# Patient Record
Sex: MALE | Race: WHITE | NOT HISPANIC OR LATINO | Employment: FULL TIME | URBAN - METROPOLITAN AREA
[De-identification: names, ages, dates, MRNs, and addresses within clinical notes are randomized per-mention and may not be internally consistent; named-entity substitution may affect disease eponyms.]

---

## 2018-06-13 ENCOUNTER — HOSPITAL ENCOUNTER (EMERGENCY)
Facility: HOSPITAL | Age: 23
Discharge: HOME | End: 2018-06-13
Attending: EMERGENCY MEDICINE | Admitting: EMERGENCY MEDICINE

## 2018-06-13 ENCOUNTER — APPOINTMENT (EMERGENCY)
Dept: CT IMAGING | Facility: HOSPITAL | Age: 23
End: 2018-06-13

## 2018-06-13 VITALS
OXYGEN SATURATION: 98 % | DIASTOLIC BLOOD PRESSURE: 66 MMHG | HEART RATE: 88 BPM | WEIGHT: 125.88 LBS | TEMPERATURE: 98.9 F | RESPIRATION RATE: 18 BRPM | SYSTOLIC BLOOD PRESSURE: 128 MMHG

## 2018-06-13 DIAGNOSIS — R56.9 SEIZURE (HCC): Primary | ICD-10-CM

## 2018-06-13 DIAGNOSIS — F19.10 SUBSTANCE ABUSE (HCC): ICD-10-CM

## 2018-06-13 LAB
ALBUMIN SERPL BCP-MCNC: 4.6 G/DL (ref 3.5–5)
ALP SERPL-CCNC: 59 U/L (ref 46–116)
ALT SERPL W P-5'-P-CCNC: 28 U/L (ref 12–78)
AMPHETAMINES SERPL QL SCN: POSITIVE
ANION GAP SERPL CALCULATED.3IONS-SCNC: 9 MMOL/L (ref 4–13)
AST SERPL W P-5'-P-CCNC: 19 U/L (ref 5–45)
BACTERIA UR QL AUTO: ABNORMAL /HPF
BARBITURATES UR QL: NEGATIVE
BASOPHILS # BLD AUTO: 0.03 THOUSANDS/ΜL (ref 0–0.1)
BASOPHILS NFR BLD AUTO: 0 % (ref 0–1)
BENZODIAZ UR QL: POSITIVE
BILIRUB SERPL-MCNC: 0.4 MG/DL (ref 0.2–1)
BILIRUB UR QL STRIP: NEGATIVE
BUN SERPL-MCNC: 15 MG/DL (ref 5–25)
CALCIUM SERPL-MCNC: 9.1 MG/DL (ref 8.3–10.1)
CHLORIDE SERPL-SCNC: 105 MMOL/L (ref 100–108)
CK MB SERPL-MCNC: 1.4 % (ref 0–2.5)
CK MB SERPL-MCNC: 3.2 NG/ML (ref 0–5)
CK SERPL-CCNC: 230 U/L (ref 39–308)
CLARITY UR: ABNORMAL
CO2 SERPL-SCNC: 26 MMOL/L (ref 21–32)
COCAINE UR QL: NEGATIVE
COLOR UR: ABNORMAL
CREAT SERPL-MCNC: 0.87 MG/DL (ref 0.6–1.3)
EOSINOPHIL # BLD AUTO: 0.07 THOUSAND/ΜL (ref 0–0.61)
EOSINOPHIL NFR BLD AUTO: 1 % (ref 0–6)
ERYTHROCYTE [DISTWIDTH] IN BLOOD BY AUTOMATED COUNT: 11.9 % (ref 11.6–15.1)
GFR SERPL CREATININE-BSD FRML MDRD: 124 ML/MIN/1.73SQ M
GLUCOSE SERPL-MCNC: 100 MG/DL (ref 65–140)
GLUCOSE SERPL-MCNC: 91 MG/DL (ref 65–140)
GLUCOSE UR STRIP-MCNC: NEGATIVE MG/DL
HCT VFR BLD AUTO: 38.1 % (ref 36.5–49.3)
HGB BLD-MCNC: 13.3 G/DL (ref 12–17)
HGB UR QL STRIP.AUTO: ABNORMAL
KETONES UR STRIP-MCNC: ABNORMAL MG/DL
LEUKOCYTE ESTERASE UR QL STRIP: NEGATIVE
LYMPHOCYTES # BLD AUTO: 1.64 THOUSANDS/ΜL (ref 0.6–4.47)
LYMPHOCYTES NFR BLD AUTO: 20 % (ref 14–44)
MCH RBC QN AUTO: 28.9 PG (ref 26.8–34.3)
MCHC RBC AUTO-ENTMCNC: 34.9 G/DL (ref 31.4–37.4)
MCV RBC AUTO: 83 FL (ref 82–98)
METHADONE UR QL: NEGATIVE
MONOCYTES # BLD AUTO: 0.46 THOUSAND/ΜL (ref 0.17–1.22)
MONOCYTES NFR BLD AUTO: 6 % (ref 4–12)
NEUTROPHILS # BLD AUTO: 6.17 THOUSANDS/ΜL (ref 1.85–7.62)
NEUTS SEG NFR BLD AUTO: 74 % (ref 43–75)
NITRITE UR QL STRIP: NEGATIVE
NON-SQ EPI CELLS URNS QL MICRO: ABNORMAL /HPF
OPIATES UR QL SCN: NEGATIVE
PCP UR QL: NEGATIVE
PH UR STRIP.AUTO: 7 [PH] (ref 4.5–8)
PLATELET # BLD AUTO: 206 THOUSANDS/UL (ref 149–390)
PMV BLD AUTO: 9.6 FL (ref 8.9–12.7)
POTASSIUM SERPL-SCNC: 4.1 MMOL/L (ref 3.5–5.3)
PROLACTIN SERPL-MCNC: 12.3 NG/ML (ref 2.5–17.4)
PROT SERPL-MCNC: 7.5 G/DL (ref 6.4–8.2)
PROT UR STRIP-MCNC: ABNORMAL MG/DL
RBC # BLD AUTO: 4.6 MILLION/UL (ref 3.88–5.62)
RBC #/AREA URNS AUTO: ABNORMAL /HPF
SODIUM SERPL-SCNC: 140 MMOL/L (ref 136–145)
SP GR UR STRIP.AUTO: 1.02 (ref 1–1.03)
T4 FREE SERPL-MCNC: 0.96 NG/DL (ref 0.78–1.33)
THC UR QL: POSITIVE
TSH SERPL DL<=0.05 MIU/L-ACNC: 0.39 UIU/ML (ref 0.46–3.98)
UROBILINOGEN UR QL STRIP.AUTO: 0.2 E.U./DL
WBC # BLD AUTO: 8.37 THOUSAND/UL (ref 4.31–10.16)
WBC #/AREA URNS AUTO: ABNORMAL /HPF

## 2018-06-13 PROCEDURE — 82948 REAGENT STRIP/BLOOD GLUCOSE: CPT

## 2018-06-13 PROCEDURE — 82553 CREATINE MB FRACTION: CPT | Performed by: EMERGENCY MEDICINE

## 2018-06-13 PROCEDURE — 85025 COMPLETE CBC W/AUTO DIFF WBC: CPT | Performed by: EMERGENCY MEDICINE

## 2018-06-13 PROCEDURE — 80053 COMPREHEN METABOLIC PANEL: CPT | Performed by: EMERGENCY MEDICINE

## 2018-06-13 PROCEDURE — 82550 ASSAY OF CK (CPK): CPT | Performed by: EMERGENCY MEDICINE

## 2018-06-13 PROCEDURE — 99285 EMERGENCY DEPT VISIT HI MDM: CPT

## 2018-06-13 PROCEDURE — 84443 ASSAY THYROID STIM HORMONE: CPT | Performed by: EMERGENCY MEDICINE

## 2018-06-13 PROCEDURE — 80307 DRUG TEST PRSMV CHEM ANLYZR: CPT | Performed by: EMERGENCY MEDICINE

## 2018-06-13 PROCEDURE — 93005 ELECTROCARDIOGRAM TRACING: CPT

## 2018-06-13 PROCEDURE — 96360 HYDRATION IV INFUSION INIT: CPT

## 2018-06-13 PROCEDURE — 70450 CT HEAD/BRAIN W/O DYE: CPT

## 2018-06-13 PROCEDURE — 84146 ASSAY OF PROLACTIN: CPT | Performed by: EMERGENCY MEDICINE

## 2018-06-13 PROCEDURE — 84439 ASSAY OF FREE THYROXINE: CPT | Performed by: EMERGENCY MEDICINE

## 2018-06-13 PROCEDURE — 93010 ELECTROCARDIOGRAM REPORT: CPT | Performed by: INTERNAL MEDICINE

## 2018-06-13 PROCEDURE — 36415 COLL VENOUS BLD VENIPUNCTURE: CPT | Performed by: EMERGENCY MEDICINE

## 2018-06-13 PROCEDURE — 81001 URINALYSIS AUTO W/SCOPE: CPT

## 2018-06-13 RX ORDER — IBUPROFEN 400 MG/1
400 TABLET ORAL ONCE
Status: COMPLETED | OUTPATIENT
Start: 2018-06-13 | End: 2018-06-13

## 2018-06-13 RX ADMIN — IBUPROFEN 400 MG: 400 TABLET, FILM COATED ORAL at 19:17

## 2018-06-13 RX ADMIN — SODIUM CHLORIDE 1000 ML: 0.9 INJECTION, SOLUTION INTRAVENOUS at 16:35

## 2018-06-13 NOTE — ED PROVIDER NOTES
History  Chief Complaint   Patient presents with    Seizure - New Onset     from work via EMS with witnessed,new onset seizure outside @ work  46-year-old male presents to the emergency department via EMS from work site  Patient works as a  and report given by EMS entailed that a co-worker came around the work vehicle and found the patient on the ground with diffuse shaking/seizing  He was briefly unresponsive as this occurred  Exact duration unknown as the onset was not witnessed  Shaking continued for less than a minute or so based on description  Patient did not have incontinence  No tongue biting  Coworkers note that the patient did seem aggressive and was fighting a bit after the event  He seemed confused, delayed in his speech and was stumbling  The patient reports feeling fine  He relates that today he has felt [place]    He tells me that he only ate a granola bar all day long  He tells me that he has not gone shopping recently and that he only had granola bars in his home  He reports drinking only 1 small bottle of water despite working outside in the heat since 06:00  He denies having any headache  He denies any palpitations, chest pain or shortness of breath  He denies having any vision changes or noted weakness or paresthesias in the extremities  He denies having ever had a seizure in the past   He denies having had any recent weight changes  He denies use of tobacco, alcohol as well as any other substances  When questioned regarding any medical conditions he shares that he does have a history of migraines, concussions and ADHD  He denies use of any medications  During initial evaluation patient was on the phone with his father  His father wished to speak with me and expressed concern regarding to things  He relates that when he spoke with his son briefly his son did not recall that his stepmother had been at his house yesterday    Father additionally expresses concern that today's event is likely the result of him using what ever he has been using  Father expresses strong concern for substance abuse  He notes that the patient does not eat properly and has been losing weight since having been in a rehab a urine half ago  He notes that the patient has been sent home from work on occasion including once last week with slurred is of his speech and difficulty with his balance  He notes that the patient is usually very motivated though recently has had increased sleep and loss of drive to work  He notes that the patient's brother is currently in a rehab and the father relates that he is offered to the patient to get him medical attention (enrolling him on his insurance) and getting him this help  Patient has denied having a problem/need for evaluation/treatment  None       Past Medical History:   Diagnosis Date    ADHD        History reviewed  No pertinent surgical history  History reviewed  No pertinent family history  I have reviewed and agree with the history as documented  Social History   Substance Use Topics    Smoking status: Never Smoker    Smokeless tobacco: Never Used    Alcohol use No        Review of Systems   All other systems reviewed and are negative  Physical Exam  Physical Exam   Constitutional: He appears well-developed and well-nourished  HENT:   Head: Normocephalic and atraumatic  Eyes: Conjunctivae and EOM are normal  Pupils are equal, round, and reactive to light  Cardiovascular: Regular rhythm  Mildly tachycardic   Pulmonary/Chest: Effort normal and breath sounds normal    Abdominal: Soft  Bowel sounds are normal    Musculoskeletal: Normal range of motion  Neurological: He is alert  Patient oriented to self  Uncertain as to which hospital he was in  He is aware that it is June of 2018 though was initially answering "June" for the year  He is uncertain as to what the date is and thinks it may be Thursday    It is currently Wednesday  Clear speech  No facial asymmetry/ deficit appreciated  Pupils briskly reactive to light  EOMI  No nystagmus  Strong eye closure & symmetric brow raise  Ability to insufflate cheeks, protrude tongue midline & range this laterally  Symmetric/ intact sensation in the upper, mid & lower portions of the face  5/5 strength on head turn, shoulder shrug, bicep, tricep & deltoid movement against resistance b/l   5/5 strength on b/l hand , pincer grasp, finger abduction, dorsi & volar flexion, hip flexion, dorsi & plantar flexion  Symmetric grossly intact sensation in the UE & LE  No dysmetria  Skin: Skin is warm and dry  Psychiatric: He has a normal mood and affect  Patient is rather anxious especially when talking with his father  He tells me that he is fine and he does not know Chari Carreon my boss did this     Nursing note and vitals reviewed        Vital Signs  ED Triage Vitals   Temperature Pulse Respirations Blood Pressure SpO2   06/13/18 1551 06/13/18 1551 06/13/18 1551 06/13/18 1551 06/13/18 1551   98 9 °F (37 2 °C) 88 18 121/69 97 %      Temp Source Heart Rate Source Patient Position - Orthostatic VS BP Location FiO2 (%)   06/13/18 1551 06/13/18 1551 06/13/18 1551 06/13/18 1551 --   Oral Monitor Lying Right arm       Pain Score       06/13/18 1917       7           Vitals:    06/13/18 1551 06/13/18 1915   BP: 121/69 128/66   Pulse: 88 88   Patient Position - Orthostatic VS: Lying        Visual Acuity      ED Medications  Medications   sodium chloride 0 9 % bolus 1,000 mL (0 mL Intravenous Stopped 6/13/18 1740)   ibuprofen (MOTRIN) tablet 400 mg (400 mg Oral Given 6/13/18 1917)       Diagnostic Studies  Results Reviewed     Procedure Component Value Units Date/Time    Rapid drug screen, urine [[id number]]  (Abnormal) Collected:  06/13/18 1742    Lab Status:  Final result Specimen:  Urine from Urine, Clean Catch Updated:  06/13/18 1914     Amph/Meth UR Positive (A)     Barbiturate Ur Negative     Benzodiazepine Urine Positive (A)     Cocaine Urine Negative     Methadone Urine Negative     Opiate Urine Negative     PCP Ur Negative     THC Urine Positive (A)    Narrative:         Presumptive report  If requested, specimen will be sent to reference lab for confirmation  FOR MEDICAL PURPOSES ONLY  IF CONFIRMATION NEEDED PLEASE CONTACT THE LAB WITHIN 5 DAYS      Drug Screen Cutoff Levels:  AMPHETAMINE/METHAMPHETAMINES  1000 ng/mL  BARBITURATES     200 ng/mL  BENZODIAZEPINES     200 ng/mL  COCAINE      300 ng/mL  METHADONE      300 ng/mL  OPIATES      300 ng/mL  PHENCYCLIDINE     25 ng/mL  THC       50 ng/mL    Prolactin [[id number]]  (Normal) Collected:  06/13/18 1642    Lab Status:  Final result Specimen:  Blood Updated:  06/13/18 1903     Prolactin 12 3 ng/mL     T4, free [[id number]]  (Normal) Collected:  06/13/18 1734    Lab Status:  Final result Specimen:  Blood Updated:  06/13/18 1901     Free T4 0 96 ng/dL     Urine Microscopic [[id number]]  (Abnormal) Collected:  06/13/18 1751    Lab Status:  Final result Specimen:  Urine from Urine, Clean Catch Updated:  06/13/18 1813     RBC, UA 4-10 (A) /hpf      WBC, UA 2-4 (A) /hpf      Epithelial Cells None Seen /hpf      Bacteria, UA None Seen /hpf     ED Urine Macroscopic [[id number]]  (Abnormal) Collected:  06/13/18 1751    Lab Status:  Final result Specimen:  Urine Updated:  06/13/18 1746     Color, UA Jeanette     Clarity, UA Cloudy     pH, UA 7 0     Leukocytes, UA Negative     Nitrite, UA Negative     Protein, UA Trace (A) mg/dl      Glucose, UA Negative mg/dl      Ketones, UA Trace (A) mg/dl      Urobilinogen, UA 0 2 E U /dl      Bilirubin, UA Negative     Blood, UA Trace (A)     Specific Gilbert, UA 1 025    Narrative:       CLINITEK RESULT    CKMB [[id number]]  (Normal) Collected:  06/13/18 1640    Lab Status:  Final result Specimen:  Blood from Arm, Right Updated:  06/13/18 1738     CK-MB Index 1 4 %      CK-MB FRACTION 3 2 ng/mL     TSH [[id number]] (Abnormal) Collected:  06/13/18 1640    Lab Status:  Final result Specimen:  Blood from Arm, Right Updated:  06/13/18 1724     TSH 3RD GENERATON 0 390 (L) uIU/mL     Narrative:         Patients undergoing fluorescein dye angiography may retain small amounts of fluorescein in the body for 48-72 hours post procedure  Samples containing fluorescein can produce falsely depressed TSH values  If the patient had this procedure,a specimen should be resubmitted post fluorescein clearance  CK Total with Reflex CKMB [[id number]]  (Normal) Collected:  06/13/18 1640    Lab Status:  Final result Specimen:  Blood from Arm, Right Updated:  06/13/18 1724     Total  U/L     Comprehensive metabolic panel [[id number]] Collected:  06/13/18 1640    Lab Status:  Final result Specimen:  Blood from Arm, Right Updated:  06/13/18 1711     Sodium 140 mmol/L      Potassium 4 1 mmol/L      Chloride 105 mmol/L      CO2 26 mmol/L      Anion Gap 9 mmol/L      BUN 15 mg/dL      Creatinine 0 87 mg/dL      Glucose 100 mg/dL      Calcium 9 1 mg/dL      AST 19 U/L      ALT 28 U/L      Alkaline Phosphatase 59 U/L      Total Protein 7 5 g/dL      Albumin 4 6 g/dL      Total Bilirubin 0 40 mg/dL      eGFR 124 ml/min/1 73sq m     Narrative:         National Kidney Disease Education Program recommendations are as follows:  GFR calculation is accurate only with a steady state creatinine  Chronic Kidney disease less than 60 ml/min/1 73 sq  meters  Kidney failure less than 15 ml/min/1 73 sq  meters      CBC and differential [[id number]]  (Normal) Collected:  06/13/18 1640    Lab Status:  Final result Specimen:  Blood from Arm, Right Updated:  06/13/18 1645     WBC 8 37 Thousand/uL      RBC 4 60 Million/uL      Hemoglobin 13 3 g/dL      Hematocrit 38 1 %      MCV 83 fL      MCH 28 9 pg      MCHC 34 9 g/dL      RDW 11 9 %      MPV 9 6 fL      Platelets 981 Thousands/uL      Neutrophils Relative 74 %      Lymphocytes Relative 20 %      Monocytes Relative 6 % Eosinophils Relative 1 %      Basophils Relative 0 %      Neutrophils Absolute 6 17 Thousands/µL      Lymphocytes Absolute 1 64 Thousands/µL      Monocytes Absolute 0 46 Thousand/µL      Eosinophils Absolute 0 07 Thousand/µL      Basophils Absolute 0 03 Thousands/µL     Fingerstick Glucose (POCT) [[id number]]  (Normal) Collected:  06/13/18 1556    Lab Status:  Final result Updated:  06/13/18 1558     POC Glucose 91 mg/dl                  CT head without contrast   Final Result by Yumi Sultana DO (06/13 1716)      No acute intracranial abnormality  Workstation performed: [id number]                    Procedures  Procedures       Phone Contacts  ED Phone Contact    ED Course     ED Course as of Jun 14 2220 Wed Jun 13, 2018   1941 I spoke with the patient  He does note slight frontal headache and lightheadedness  I reviewed study results with him as well as visitor Laurence Lai) present  Patient does admit to use of THC  He relates that he does not know why he tested positive for methamphetamines or benzodiazepines  He relates that he did "not have time at work to even take anything such as pain medication "  He expresses frustration that he is working all day long at his current job and reports his employer having high demands  I expressed concern that his seizure today could very well be related to substance abuse in the setting of recent use or potential withdrawal   Patient denies having used anything at all recently  I indicated that when I spoke with his father he had expressed concern for substance abuse noting that the patient seemed to be worsening since he was last in rehab  The patient relates that his father Valeria Lui says that    I voiced what his father shared about the possibility of him being enrolled on his father's insurance so that it would be easier for him to have further medical care  Patient indicates he is not interested in that    I offered repeatedly for our crisis worker to speak with him if he was interested with regard to substance abuse or depression  Patient does answer affirmatively to having felt somewhat depressed but he relates that it is not too bad    He vehemently denies having had any SI  He is not interested in speaking w/ Crisis  I did discuss the need to avoid driving and operating machinery as he could potentially have another seizure at any time  He is being provided with the information for Neurology to follow up with them in their office and is aware that they will need to clear him in order to resume the above-listed activities  All questions answered  MDM  CritCare Time    Disposition  Final diagnoses:   Seizure ([facility] [address] )   Substance abuse     Time reflects when diagnosis was documented in both MDM as applicable and the Disposition within this note     Time User Action Codes Description Comment    6/13/2018  7:45 PM Yuliana Bobor Add [R56 9] Seizure ([facility] [address] )     6/13/2018  7:45 PM Yuliana Bobor Add [F19 10] Substance abuse       ED Disposition     ED Disposition Condition Comment    Discharge  Wyvonne Sandifer discharge to home/self care  Condition at discharge: Good        Follow-up Information     Follow up With Specialties Details Why [address] [facility] Schedule an appointment as soon as possible for a visit  [address] [facility] [address] [phone]          There are no discharge medications for this patient  No discharge procedures on file      ED Provider  Electronically Signed by           Karis Marley MD  06/14/18 4536

## 2018-06-13 NOTE — DISCHARGE INSTRUCTIONS
New-Onset Seizure in Adults   WHAT YOU NEED TO KNOW:   A seizure is a burst of electrical activity in your brain  A seizure may start in one part of your brain, or both sides may be affected  The seizure may last a few seconds or up to 5 minutes  A new-onset seizure is a seizure that happens for the first time  Some common triggers are alcohol, drugs, lack of sleep, fever, or an infection  High or low blood sugar levels, pregnancy, a head injury, or a stroke could also trigger a seizure  The cause of your seizure may not be known  You have a higher risk for another seizure within the next 2 years  DISCHARGE INSTRUCTIONS:   Call 911 or have someone else call for any of the following:   · Your seizure lasts longer than 5 minutes  · You have a second seizure that happens within 24 hours of your first     · You have trouble breathing after a seizure  · You cannot be woken after your seizure  · You have more than 1 seizure before you are fully awake or aware  Seek care immediately if:   · You are injured during a seizure  Contact your healthcare provider if:   · You have a fever  · You are planning to get pregnant or are currently pregnant  · You have questions or concerns about your condition or care  Medicines: You may be given the following:  · Antiepileptic medicine  may control or prevent another seizure  Do not  stop taking this medicine without direction from a healthcare provider  · Antibiotics  treat an infection caused by bacteria  · Take your medicine as directed  Contact your healthcare provider if you think your medicine is not helping or if you have side effects  Tell him or her if you are allergic to any medicine  Keep a list of the medicines, vitamins, and herbs you take  Include the amounts, and when and why you take them  Bring the list or the pill bottles to follow-up visits  Carry your medicine list with you in case of an emergency    What you can do to manage or prevent a seizure:   · Manage stress  Stress can trigger a seizure  Exercise can help you reduce stress  Talk to your healthcare provider about exercise that is safe for you  Other ways to manage stress include yoga, meditation, and biofeedback  Illness can be a form of stress  Eat a variety of healthy foods and drink plenty of liquids during an illness  · Set a regular sleep schedule  A lack of sleep can trigger a seizure  Try to go to sleep and wake up at the same times every day  Keep your bedroom quiet and dark  Talk to your healthcare provider if you are having trouble sleeping  · Manage other medical conditions  Manage other health conditions that may increase your risk for a seizure  Keep your blood sugar levels and blood pressure under control  · Limit or do not drink alcohol as directed  Alcohol can trigger a seizure, especially if you drink a large amount at one time  A drink of alcohol is 12 ounces of beer, 1½ ounces of liquor, or 5 ounces of wine  Talk to your healthcare provider about a safe amount of alcohol for you  Your provider may recommend that you do not drink any alcohol  Tell him or her if you need help to quit drinking  · Ask what safety precautions you should take  Talk with your healthcare provider about driving  You may not be able to drive until you are seizure-free for a period of time  You will need to check the law where you live  Also talk to your healthcare provider about swimming and bathing  You may drown or develop life-threatening heart or lung damage if you have a seizure in water  · Tell your friends, family members, and coworkers that you had a seizure  Give them written instructions to follow if you have another seizure  Follow up with your healthcare provider or neurologist as directed: You may need more tests to find the cause of your seizure  Write down your questions so you remember to ask them during your visits     © 2017 [facility]  Information is for End User's use only and may not be sold, redistributed or otherwise used for commercial purposes  All illustrations and images included in CareNotes® are the copyrighted property of [employer]  or Jasen Golden  The above information is an  only  It is not intended as medical advice for individual conditions or treatments  Talk to your doctor, nurse or pharmacist before following any medical regimen to see if it is safe and effective for you  Polysubstance Abuse   WHAT YOU NEED TO KNOW:   Polysubstance abuse is the abuse of 2 or more drugs that cause impairment or distress  Examples include alcohol, nicotine, marijuana, cocaine, heroin, methamphetamine, hallucinogens such as mushrooms, or inhalants such as paint thinner  Prescribed medicines, such as opioids for pain or benzodiazepines for anxiety, are also commonly abused  DISCHARGE INSTRUCTIONS:   Call 911 for any of the following:   · You feel you might harm yourself or others  Return to the emergency department if:   · You have a seizure  · You have chest pain and your heart is beating faster than usual      · You have new shortness of breath  · You are dizzy and lightheaded  Contact your healthcare provider or therapist if:   · You are using drugs and think you are pregnant  · You have withdrawal symptoms and want to start using drugs again  · You have questions or concerns about your condition or care  Risks of polysubstance abuse:   · Drug dependence  is when you continue to use drugs, even when you know the risks  Polysubstance abuse can damage your heart, brain, lungs, liver, and gastrointestinal tract  You continue even when it causes problems with work, school, or relationships  You may have difficulty finding or keeping a job because of your drug dependence  · Drug tolerance  is when you need to use more drugs, or use them more often, to get the effects you want   You may not be able to stop using the drugs  When you try to stop, you may have withdrawal symptoms and strong cravings for the drugs  · Drug overdose  can occur when you take more drugs than your body can handle  This may be a small amount or a large amount  You can lose consciousness or have a seizure or stroke  Your heart can stop beating, or you can stop breathing  You may die from a drug overdose  Medicines:   · Withdrawal medicines  may be given according to the types of drugs you are abusing  Withdrawal from drugs can cause serious, life-threatening side effects  Certain medicines can help decrease your withdrawal symptoms and your desire for the drug  Ask for more information about the withdrawal medicines you may need  · Mood stabilizers  may be given to help prevent or treat depression or anxiety caused by drug abuse and withdrawal      · Take your medicine as directed  Contact your healthcare provider if you think your medicine is not helping or if you have side effects  Tell him or her if you are allergic to any medicine  Keep a list of the medicines, vitamins, and herbs you take  Include the amounts, and when and why you take them  Bring the list or the pill bottles to follow-up visits  Carry your medicine list with you in case of an emergency  Follow up with your healthcare provider as directed: You may be referred to a specialist to treat health conditions caused by your drug use  This includes mental health, heart, or lung specialists  Write down your questions so you remember to ask them during your visits  Therapy:  You may need therapy and support to stop using drugs:  · Cognitive and behavioral therapy  helps you change your thinking and behavior  It can help you develop plans to avoid the situations that make you want to use drugs  It also helps you cope with the feelings of wanting to use drugs  You may have individual or group therapy       · Contingency management  helps you set drug-free goals with a therapist  Lyudmila Nash will decide ways to celebrate your success when you reach a goal      · Family therapy and support groups  allow you and your family members to talk to and be encouraged by other people affected by drug abuse  You and your family members may attend together or separately  Ask your healthcare provider for information about programs in your area  How polysubstance abuse affects unborn or  babies:   · If you are pregnant or get pregnant while using drugs, you may have a miscarriage or give birth early  Your baby may be born addicted to the drugs  · Do not breastfeed your baby if you use drugs  Drugs pass from your bloodstream into your breast milk and affect your baby's health  Talk with your healthcare provider if you are using drugs and breastfeeding  For support and more information:   · Alcoholics Anonymous  Web Address: http://DropShip/  · [employer] on Drug and Alcohol Information  Phone: 2- 319 - 4146490  Web Address: [employer]  · [employer] on Alcoholism and Drug Dependence  Danish Martinez [address] [address]  Phone: 4- 739 - 820-9645  Phone: 1- 076 - 801-5857  Web Address: FRUCT br  org  2017 [address] Information is for End User's use only and may not be sold, redistributed or otherwise used for commercial purposes  All illustrations and images included in CareNotes® are the copyrighted property of A D A M [employer]  or Jasen Golden  The above information is an  only  It is not intended as medical advice for individual conditions or treatments  Talk to your doctor, nurse or pharmacist before following any medical regimen to see if it is safe and effective for you

## 2018-06-13 NOTE — ED NOTES
Spoke with Pt 's father about his concerns about pt  Per father, pt  Has been "buying/using pills" been displaying depressed behaviors ie  Sleeping a lot and expressed SI to him and girlfriend  Pt  Denies SI/HI at this time  Barrera Casas RN made aware  Explained to father and mother, pt  Is currently being medically cleared and if pt  Is willing, crisis is available       MD made aware     Evret Ruth RN  06/13/18 [facility]  06/13/18 0170

## 2018-06-14 LAB
ATRIAL RATE: 89 BPM
P AXIS: 22 DEGREES
PR INTERVAL: 136 MS
QRS AXIS: 83 DEGREES
QRSD INTERVAL: 92 MS
QT INTERVAL: 336 MS
QTC INTERVAL: 408 MS
T WAVE AXIS: 40 DEGREES
VENTRICULAR RATE: 89 BPM

## 2018-06-14 NOTE — ED NOTES
PT forgot keys left a voicemail on his cell phone informed him to pick the keys up at the  and that security will hold onto them until he gets back        Jason Hartley  06/13/18 2029

## 2022-05-22 ENCOUNTER — HOSPITAL ENCOUNTER (EMERGENCY)
Facility: HOSPITAL | Age: 27
Discharge: HOME | End: 2022-05-22
Attending: EMERGENCY MEDICINE
Payer: MEDICARE

## 2022-05-22 VITALS
BODY MASS INDEX: 22.9 KG/M2 | OXYGEN SATURATION: 97 % | HEIGHT: 70 IN | SYSTOLIC BLOOD PRESSURE: 125 MMHG | RESPIRATION RATE: 17 BRPM | HEART RATE: 91 BPM | DIASTOLIC BLOOD PRESSURE: 76 MMHG | WEIGHT: 160 LBS | TEMPERATURE: 98.8 F

## 2022-05-22 DIAGNOSIS — R36.9 PENILE DISCHARGE: Primary | ICD-10-CM

## 2022-05-22 DIAGNOSIS — A64 STD (MALE): ICD-10-CM

## 2022-05-22 DIAGNOSIS — N34.2 URETHRITIS: ICD-10-CM

## 2022-05-22 PROCEDURE — 87591 N.GONORRHOEAE DNA AMP PROB: CPT | Performed by: EMERGENCY MEDICINE

## 2022-05-22 PROCEDURE — 87491 CHLMYD TRACH DNA AMP PROBE: CPT | Performed by: EMERGENCY MEDICINE

## 2022-05-22 PROCEDURE — 99283 EMERGENCY DEPT VISIT LOW MDM: CPT

## 2022-05-22 PROCEDURE — 96372 THER/PROPH/DIAG INJ SC/IM: CPT

## 2022-05-22 PROCEDURE — 99284 EMERGENCY DEPT VISIT MOD MDM: CPT | Performed by: EMERGENCY MEDICINE

## 2022-05-22 RX ORDER — AZITHROMYCIN 500 MG/1
1000 TABLET, FILM COATED ORAL ONCE
Status: COMPLETED | OUTPATIENT
Start: 2022-05-22 | End: 2022-05-22

## 2022-05-22 RX ADMIN — CEFTRIAXONE 500 MG: 1 INJECTION, POWDER, FOR SOLUTION INTRAMUSCULAR; INTRAVENOUS at 12:18

## 2022-05-22 RX ADMIN — AZITHROMYCIN 1000 MG: 500 TABLET, FILM COATED ORAL at 12:18

## 2022-05-22 NOTE — ED PROVIDER NOTES
History  Chief Complaint   Patient presents with    Penile Discharge     Patient states his penis is itching and burning and has discharge starting 3 days ago  HPI  Patient is a 62-year-old male presenting with penile discharge and dysuria  Patient states that he recently got out of [employer] and had unprotected sex roughly 3 weeks ago and has been developing whitish discharge per penis as well as dysuria since couple days ago  Patient denies any testicular pain or swelling, and does not have any ulcerations in the penis  He also notes that he has been developing mouth ulcers which is not common for him  Denies any other symptoms  Denies fever, chills, nausea, vomiting, diarrhea  None       Past Medical History:   Diagnosis Date    ADHD        History reviewed  No pertinent surgical history  History reviewed  No pertinent family history  I have reviewed and agree with the history as documented  E-Cigarette/Vaping    E-Cigarette Use Never User      E-Cigarette/Vaping Substances    Nicotine No     THC No     CBD No     Flavoring No     Other No     Unknown No      Social History     Tobacco Use    Smoking status: Never Smoker    Smokeless tobacco: Never Used   Vaping Use    Vaping Use: Never used   Substance Use Topics    Alcohol use: No    Drug use: No        Review of Systems   Constitutional: Negative for chills, diaphoresis, fever and unexpected weight change  HENT: Negative for ear pain and sore throat  Eyes: Negative for visual disturbance  Respiratory: Negative for cough, chest tightness and shortness of breath  Cardiovascular: Negative for chest pain and leg swelling  Gastrointestinal: Negative for abdominal distention, abdominal pain, constipation, diarrhea, nausea and vomiting  Endocrine: Negative  Genitourinary: Positive for dysuria and penile discharge  Negative for difficulty urinating  Musculoskeletal: Negative  Skin: Negative  Allergic/Immunologic: Negative  Neurological: Negative  Hematological: Negative  Psychiatric/Behavioral: Negative  All other systems reviewed and are negative  Physical Exam  ED Triage Vitals [05/22/22 1039]   Temperature Pulse Respirations Blood Pressure SpO2   98 8 °F (37 1 °C) 91 17 125/76 97 %      Temp Source Heart Rate Source Patient Position - Orthostatic VS BP Location FiO2 (%)   Oral Monitor Sitting Left arm --      Pain Score       --             Orthostatic Vital Signs  Vitals:    05/22/22 1039   BP: 125/76   Pulse: 91   Patient Position - Orthostatic VS: Sitting       Physical Exam  Vitals and nursing note reviewed  Constitutional:       General: He is not in acute distress  Appearance: Normal appearance  He is not ill-appearing  HENT:      Head: Normocephalic and atraumatic  Right Ear: External ear normal       Left Ear: External ear normal       Nose: Nose normal       Mouth/Throat:      Mouth: Mucous membranes are moist       Pharynx: Oropharynx is clear  Eyes:      General: No scleral icterus  Right eye: No discharge  Left eye: No discharge  Extraocular Movements: Extraocular movements intact  Conjunctiva/sclera: Conjunctivae normal       Pupils: Pupils are equal, round, and reactive to light  Cardiovascular:      Rate and Rhythm: Normal rate and regular rhythm  Pulses: Normal pulses  Heart sounds: Normal heart sounds  Pulmonary:      Effort: Pulmonary effort is normal       Breath sounds: Normal breath sounds  Abdominal:      General: Abdomen is flat  Bowel sounds are normal  There is no distension  Palpations: Abdomen is soft  Tenderness: There is no abdominal tenderness  There is no guarding or rebound  Genitourinary:     Penis: Normal        Testes: Normal    Musculoskeletal:         General: Normal range of motion  Cervical back: Normal range of motion and neck supple     Skin:     General: Skin is warm and dry  Capillary Refill: Capillary refill takes less than 2 seconds  Neurological:      General: No focal deficit present  Mental Status: He is alert and oriented to person, place, and time  Mental status is at baseline  Psychiatric:         Mood and Affect: Mood normal          Behavior: Behavior normal          Thought Content: Thought content normal          Judgment: Judgment normal          ED Medications  Medications   cefTRIAXone (ROCEPHIN) 500 mg in lidocaine (PF) (XYLOCAINE-MPF) 1 % IM only syringe (500 mg Intramuscular Given 5/22/22 1218)   azithromycin (ZITHROMAX) tablet 1,000 mg (1,000 mg Oral Given 5/22/22 1218)       Diagnostic Studies  Results Reviewed     Procedure Component Value Units Date/Time    Chlamydia/GC amplified DNA by PCR [[id number]] Collected: 05/22/22 1221    Lab Status:  In process Specimen: Urine, Other Updated: 05/22/22 1226                 No orders to display         Procedures  Procedures      ED Course                                       MDM  Number of Diagnoses or Management Options  Penile discharge  STD (male)  Urethritis  Diagnosis management comments:    Patient is a 28-year-old male presenting with dysuria, penile discharge  Testing for gonorrhea and chlamydia  Will provide ceftriaxone and azithromycin  Patient discharge with instructions to follow up with [facility] for further STD testing  Patient agrees obtained  Discharge with return precautions provided    Disposition  Final diagnoses:   Penile discharge   STD (male)   Urethritis     Time reflects when diagnosis was documented in both MDM as applicable and the Disposition within this note     Time User Action Codes Description Comment    5/22/2022 11:59 AM Raheem Doss [R36 9] Penile discharge     5/22/2022 12:00 PM [address] STD (male)     5/22/2022 12:00 PM Raheem Doss [N34 2] Urethritis       ED Disposition     ED Disposition   Discharge    Condition   Stable    Date/Time   Sun May 22, 2022 11:59 AM    Comment   Kimi Bell discharge to home/self care  Follow-up Information     Follow up With Specialties Details Why Contact Info Additional [address] Emergency Department Emergency Medicine Go to  If symptoms worsen [address]  [address] [place] [address] Emergency Department, [facility], [id number] [phone]          There are no discharge medications for this patient  No discharge procedures on file  PDMP Review     None           ED Provider  Attending physically available and evaluated Kimi Bell I managed the patient along with the ED Attending      Electronically Signed by         Sean Espinal MD  05/23/22 6541

## 2022-05-22 NOTE — ED ATTENDING ATTESTATION
5/22/2022  I, Marvin Flores MD, saw and evaluated the patient  I have discussed the patient with the resident/non-physician practitioner and agree with the resident's/non-physician practitioner's findings, Plan of Care, and MDM as documented in the resident's/non-physician practitioner's note, except where noted  All available labs and Radiology studies were reviewed  I was present for key portions of any procedure(s) performed by the resident/non-physician practitioner and I was immediately available to provide assistance  At this point I agree with the current assessment done in the Emergency Department  I have conducted an independent evaluation of this patient a history and physical is as follows:    ED Course         Critical Care Time  Procedures    28 yo male just got out of retirement and had sex recently unprotected and having few days of white discharge  No external itching, lesions  No fever, no chills, no abdominal pain  Vss, afebrile, lungs cta, rrr, abdomen soft nontender, no testicular or penile tenderness, lesions  gc and chlamydia

## 2022-05-24 ENCOUNTER — TELEPHONE (OUTPATIENT)
Dept: EMERGENCY DEPT | Facility: HOSPITAL | Age: 27
End: 2022-05-24

## 2022-05-24 LAB
C TRACH DNA SPEC QL NAA+PROBE: POSITIVE
N GONORRHOEA DNA SPEC QL NAA+PROBE: POSITIVE

## 2022-05-24 NOTE — TELEPHONE ENCOUNTER
Pt called in requesting results of GC - informed of + for both gonorrhea and chlamydia  Reviewed chart showing that pt received ceftriaxone 500mg IM x 1 and azithromycin 1g PO x 1  Pt still having some dysuria  Informed that this could be expected until pathogens cleared and inflammation subsides  Refrain from intercourse, if symptoms persist next week call or return to ED for additional treatment  May need rx for doxycycline+metronidazole, or even gentamicin 240mg IM or 320mg PO  Another possibility if symptoms persist could be concomitant HSV infection  But would allow initial empiric regimen additional time for clinical effect

## 2023-04-04 ENCOUNTER — HOSPITAL ENCOUNTER (EMERGENCY)
Facility: HOSPITAL | Age: 28
Discharge: HOME | End: 2023-04-04
Attending: EMERGENCY MEDICINE

## 2023-04-04 VITALS
SYSTOLIC BLOOD PRESSURE: 126 MMHG | DIASTOLIC BLOOD PRESSURE: 61 MMHG | RESPIRATION RATE: 18 BRPM | TEMPERATURE: 100.9 F | HEART RATE: 124 BPM | OXYGEN SATURATION: 97 %

## 2023-04-04 DIAGNOSIS — R11.10 VOMITING: ICD-10-CM

## 2023-04-04 DIAGNOSIS — R51.9 HEADACHE: Primary | ICD-10-CM

## 2023-04-04 DIAGNOSIS — R50.9 FEVER: ICD-10-CM

## 2023-04-04 RX ORDER — ONDANSETRON 4 MG/1
4 TABLET, FILM COATED ORAL EVERY 6 HOURS
Qty: 10 TABLET | Refills: 0 | Status: SHIPPED | OUTPATIENT
Start: 2023-04-04

## 2023-04-04 RX ORDER — KETOROLAC TROMETHAMINE 30 MG/ML
15 INJECTION, SOLUTION INTRAMUSCULAR; INTRAVENOUS ONCE
Status: COMPLETED | OUTPATIENT
Start: 2023-04-04 | End: 2023-04-04

## 2023-04-04 RX ORDER — ACETAMINOPHEN 325 MG/1
975 TABLET ORAL ONCE
Status: COMPLETED | OUTPATIENT
Start: 2023-04-04 | End: 2023-04-04

## 2023-04-04 RX ORDER — ONDANSETRON 4 MG/1
4 TABLET, ORALLY DISINTEGRATING ORAL ONCE
Status: COMPLETED | OUTPATIENT
Start: 2023-04-04 | End: 2023-04-04

## 2023-04-04 RX ADMIN — ACETAMINOPHEN 975 MG: 325 TABLET ORAL at 22:07

## 2023-04-04 RX ADMIN — ONDANSETRON 4 MG: 4 TABLET, ORALLY DISINTEGRATING ORAL at 22:11

## 2023-04-04 RX ADMIN — KETOROLAC TROMETHAMINE 15 MG: 30 INJECTION, SOLUTION INTRAMUSCULAR; INTRAVENOUS at 22:07

## 2023-04-05 NOTE — DISCHARGE INSTRUCTIONS
You were seen for vomiting, headache, and fever  You likely have a viral infection  You felt better after medicine  You can fill the prescription if needed for nausea  You can take 975 mg Tylenol and 400 mg Advil every 6 hours for pain/fever

## 2023-04-05 NOTE — ED PROVIDER NOTES
History  Chief Complaint   Patient presents with   • Medical Problem     Pt reports R leg cramping, vomiting, lightheadedness, headache since 3m     27-year-old man with no relevant asthma history presents with vomiting, headache, and leg cramps  Patient worked today outside as a  and at the end of his shift started to develop vomiting  He reports 6 episodes of nonbloody nonbilious vomiting  He then tried to have some to drink and then vomited another 6 times  Patient has since developed a headache and had an episode of right thigh cramping that quickly resolved  At this time, he is no longer nauseous but is reporting headache  He has not taken any medications for his symptoms yet  He denies any abdominal pain or diarrhea  None       Past Medical History:   Diagnosis Date   • ADHD        History reviewed  No pertinent surgical history  History reviewed  No pertinent family history  I have reviewed and agree with the history as documented  E-Cigarette/Vaping   • E-Cigarette Use Never User      E-Cigarette/Vaping Substances   • Nicotine Yes    • THC No    • CBD No    • Flavoring No    • Other No    • Unknown No      Social History     Tobacco Use   • Smoking status: Never   • Smokeless tobacco: Never   Vaping Use   • Vaping Use: Never used   Substance Use Topics   • Alcohol use: No   • Drug use: No        Review of Systems   Constitutional: Negative for chills  HENT: Negative for ear pain and sore throat  Eyes: Negative for pain and visual disturbance  Respiratory: Negative for cough and shortness of breath  Cardiovascular: Negative for chest pain and palpitations  Gastrointestinal: Positive for vomiting  Negative for abdominal pain, constipation and diarrhea  Genitourinary: Negative for dysuria and hematuria  Musculoskeletal: Negative for arthralgias and back pain  Skin: Negative for color change and rash  Neurological: Positive for headaches   Negative for seizures, syncope and light-headedness  Psychiatric/Behavioral: Negative for agitation and confusion  Physical Exam  ED Triage Vitals [04/04/23 2150]   Temperature Pulse Respirations Blood Pressure SpO2   (!) 100 9 °F (38 3 °C) (!) 124 18 126/61 97 %      Temp Source Heart Rate Source Patient Position - Orthostatic VS BP Location FiO2 (%)   Oral -- -- -- --      Pain Score       6             Orthostatic Vital Signs  Vitals:    04/04/23 2150   BP: 126/61   Pulse: (!) 124       Physical Exam  Vitals and nursing note reviewed  Constitutional:       General: He is not in acute distress  Appearance: Normal appearance  HENT:      Head: Normocephalic and atraumatic  Right Ear: External ear normal       Left Ear: External ear normal    Cardiovascular:      Rate and Rhythm: Regular rhythm  Tachycardia present  Pulmonary:      Effort: Pulmonary effort is normal  No respiratory distress  Abdominal:      General: Abdomen is flat  Palpations: Abdomen is soft  Tenderness: There is no abdominal tenderness  Musculoskeletal:         General: Normal range of motion  Cervical back: Normal range of motion and neck supple  Skin:     General: Skin is warm and dry  Neurological:      Mental Status: He is alert and oriented to person, place, and time  Mental status is at baseline     Psychiatric:         Mood and Affect: Mood normal          Behavior: Behavior normal          ED Medications  Medications   ondansetron (ZOFRAN-ODT) dispersible tablet 4 mg (4 mg Oral Given 4/4/23 2211)   ketorolac (TORADOL) injection 15 mg (15 mg Intramuscular Given 4/4/23 2207)   acetaminophen (TYLENOL) tablet 975 mg (975 mg Oral Given 4/4/23 2207)       Diagnostic Studies  Results Reviewed     None                 No orders to display         Procedures  Procedures      ED Course  ED Course as of 04/04/23 2325 Tue Apr 04, 2023 2246 Will attempt PO challenge                                       Medical "Decision Making  Presents with 1 day of vomiting, fever, and headache  Presume secondary to virus  Will treat symptomatically and p o  challenge  Ondansetron sent to the pharmacy if desired  Patient in agreement with plan and questions were answered  Verbalized understanding of return precautions  Portions or all of this note were generated using voice recognition software  Occasional wrong word or \"sound a like\" substitutions may have occurred due to the inherent limitations of voice recognition software  Please interpret any errors within the intended context of the whole sentence or idea  Risk  OTC drugs  Prescription drug management  Disposition  Final diagnoses:   Headache   Vomiting   Fever     Time reflects when diagnosis was documented in both MDM as applicable and the Disposition within this note     Time User Action Codes Description Comment    4/4/2023 10:49 PM Joycelyn Me Add [R51 9] Headache     4/4/2023 10:49 PM Joycelyn Me Add [R11 10] Vomiting     4/4/2023 10:49 PM Joycelyn Me Add [R50 9] Fever       ED Disposition     ED Disposition   Discharge    Condition   Stable    Date/Time   Tue Apr 4, 2023 10:49 PM    Comment   Ivon Mendez discharge to home/self care  Follow-up Information     Follow up With Specialties Details Why Contact Info Additional [address] Emergency Department Emergency Medicine Go to  As needed [address] Emergency Department, [address], [address]          Patient's Medications   Discharge Prescriptions    ONDANSETRON (ZOFRAN) 4 MG TABLET    Take 1 tablet (4 mg total) by mouth every 6 (six) hours       Start Date: 4/4/2023  End Date: --       Order Dose: 4 mg       Quantity: 10 tablet    Refills: 0     No discharge procedures on file      PDMP Review     None       " ED Provider  Attending physically available and evaluated Leisa Lui  GAYE managed the patient along with the ED Attending      Electronically Signed by         Silvio Ruiz MD  04/04/23 5729

## 2023-04-05 NOTE — ED ATTENDING ATTESTATION
4/4/2023  Keily HUIZAR DO, saw and evaluated the patient  I have discussed the patient with the resident/non-physician practitioner and agree with the resident's/non-physician practitioner's findings, Plan of Care, and MDM as documented in the resident's/non-physician practitioner's note, except where noted  All available labs and Radiology studies were reviewed  I was present for key portions of any procedure(s) performed by the resident/non-physician practitioner and I was immediately available to provide assistance  At this point I agree with the current assessment done in the Emergency Department  I have conducted an independent evaluation of this patient a history and physical is as follows:    23 yo male presents for evaluation of R hamstring cramping, 6 episodes NBNB emesis, generalized HA starting 1600h 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

## 2024-02-08 ENCOUNTER — HOSPITAL ENCOUNTER (OUTPATIENT)
Dept: MRI IMAGING | Facility: HOSPITAL | Age: 29
Discharge: HOME | End: 2024-02-08
Attending: INTERNAL MEDICINE
Payer: COMMERCIAL

## 2024-02-08 DIAGNOSIS — R42 DIZZINESS: ICD-10-CM

## 2024-02-08 PROCEDURE — G1004 CDSM NDSC: HCPCS

## 2024-02-08 PROCEDURE — 70551 MRI BRAIN STEM W/O DYE: CPT

## 2024-02-22 ENCOUNTER — TELEPHONE (OUTPATIENT)
Age: 29
End: 2024-02-22

## 2024-02-22 NOTE — TELEPHONE ENCOUNTER
Patient called stated he had a referral send from . - No referral in chart.    Also, informed patient that this office does not have any available appt until the new Endocrinologist comes in July.    Patient will check with his doctor regarding referral.

## 2024-03-01 ENCOUNTER — HOSPITAL ENCOUNTER (EMERGENCY)
Facility: HOSPITAL | Age: 29
Discharge: HOME | End: 2024-03-01
Attending: EMERGENCY MEDICINE
Payer: COMMERCIAL

## 2024-03-01 VITALS
OXYGEN SATURATION: 100 % | HEART RATE: 61 BPM | TEMPERATURE: 98.2 F | DIASTOLIC BLOOD PRESSURE: 71 MMHG | RESPIRATION RATE: 18 BRPM | SYSTOLIC BLOOD PRESSURE: 136 MMHG

## 2024-03-01 DIAGNOSIS — R53.83 FATIGUE: Primary | ICD-10-CM

## 2024-03-01 LAB
ALBUMIN SERPL BCP-MCNC: 4.8 G/DL (ref 3.5–5)
ALP SERPL-CCNC: 42 U/L (ref 34–104)
ALT SERPL W P-5'-P-CCNC: 10 U/L (ref 7–52)
ANION GAP SERPL CALCULATED.3IONS-SCNC: 7 MMOL/L
AST SERPL W P-5'-P-CCNC: 12 U/L (ref 13–39)
BASOPHILS # BLD AUTO: 0.08 THOUSANDS/ÂΜL (ref 0–0.1)
BASOPHILS NFR BLD AUTO: 1 % (ref 0–1)
BILIRUB DIRECT SERPL-MCNC: 0.06 MG/DL (ref 0–0.2)
BILIRUB SERPL-MCNC: 0.43 MG/DL (ref 0.2–1)
BILIRUB UR QL STRIP: NEGATIVE
BUN SERPL-MCNC: 24 MG/DL (ref 5–25)
CALCIUM SERPL-MCNC: 9.3 MG/DL (ref 8.4–10.2)
CARDIAC TROPONIN I PNL SERPL HS: <2 NG/L
CHLORIDE SERPL-SCNC: 104 MMOL/L (ref 96–108)
CLARITY UR: NORMAL
CO2 SERPL-SCNC: 27 MMOL/L (ref 21–32)
COLOR UR: NORMAL
CREAT SERPL-MCNC: 0.94 MG/DL (ref 0.6–1.3)
EOSINOPHIL # BLD AUTO: 0.42 THOUSAND/ÂΜL (ref 0–0.61)
EOSINOPHIL NFR BLD AUTO: 6 % (ref 0–6)
ERYTHROCYTE [DISTWIDTH] IN BLOOD BY AUTOMATED COUNT: 11.9 % (ref 11.6–15.1)
GFR SERPL CREATININE-BSD FRML MDRD: 111 ML/MIN/1.73SQ M
GLUCOSE SERPL-MCNC: 89 MG/DL (ref 65–140)
GLUCOSE UR STRIP-MCNC: NEGATIVE MG/DL
HCT VFR BLD AUTO: 40.9 % (ref 36.5–49.3)
HGB BLD-MCNC: 14 G/DL (ref 12–17)
HGB UR QL STRIP.AUTO: NEGATIVE
IMM GRANULOCYTES # BLD AUTO: 0.02 THOUSAND/UL (ref 0–0.2)
IMM GRANULOCYTES NFR BLD AUTO: 0 % (ref 0–2)
KETONES UR STRIP-MCNC: NEGATIVE MG/DL
LEUKOCYTE ESTERASE UR QL STRIP: NEGATIVE
LIPASE SERPL-CCNC: 46 U/L (ref 11–82)
LYMPHOCYTES # BLD AUTO: 2.53 THOUSANDS/ÂΜL (ref 0.6–4.47)
LYMPHOCYTES NFR BLD AUTO: 33 % (ref 14–44)
MCH RBC QN AUTO: 29.1 PG (ref 26.8–34.3)
MCHC RBC AUTO-ENTMCNC: 34.2 G/DL (ref 31.4–37.4)
MCV RBC AUTO: 85 FL (ref 82–98)
MONOCYTES # BLD AUTO: 0.43 THOUSAND/ÂΜL (ref 0.17–1.22)
MONOCYTES NFR BLD AUTO: 6 % (ref 4–12)
NEUTROPHILS # BLD AUTO: 4.13 THOUSANDS/ÂΜL (ref 1.85–7.62)
NEUTS SEG NFR BLD AUTO: 54 % (ref 43–75)
NITRITE UR QL STRIP: NEGATIVE
NRBC BLD AUTO-RTO: 0 /100 WBCS
PH UR STRIP.AUTO: 7 [PH]
PLATELET # BLD AUTO: 194 THOUSANDS/UL (ref 149–390)
PMV BLD AUTO: 9.7 FL (ref 8.9–12.7)
POTASSIUM SERPL-SCNC: 3.8 MMOL/L (ref 3.5–5.3)
PROT SERPL-MCNC: 7 G/DL (ref 6.4–8.4)
PROT UR STRIP-MCNC: NEGATIVE MG/DL
RBC # BLD AUTO: 4.81 MILLION/UL (ref 3.88–5.62)
SODIUM SERPL-SCNC: 138 MMOL/L (ref 135–147)
SP GR UR STRIP.AUTO: 1.02 (ref 1–1.03)
UROBILINOGEN UR STRIP-ACNC: <2 MG/DL
WBC # BLD AUTO: 7.61 THOUSAND/UL (ref 4.31–10.16)

## 2024-03-01 PROCEDURE — 80076 HEPATIC FUNCTION PANEL: CPT | Performed by: EMERGENCY MEDICINE

## 2024-03-01 PROCEDURE — 99284 EMERGENCY DEPT VISIT MOD MDM: CPT | Performed by: EMERGENCY MEDICINE

## 2024-03-01 PROCEDURE — 93005 ELECTROCARDIOGRAM TRACING: CPT

## 2024-03-01 PROCEDURE — 80048 BASIC METABOLIC PNL TOTAL CA: CPT | Performed by: EMERGENCY MEDICINE

## 2024-03-01 PROCEDURE — 87591 N.GONORRHOEAE DNA AMP PROB: CPT | Performed by: EMERGENCY MEDICINE

## 2024-03-01 PROCEDURE — 85025 COMPLETE CBC W/AUTO DIFF WBC: CPT | Performed by: EMERGENCY MEDICINE

## 2024-03-01 PROCEDURE — 83690 ASSAY OF LIPASE: CPT | Performed by: EMERGENCY MEDICINE

## 2024-03-01 PROCEDURE — 84484 ASSAY OF TROPONIN QUANT: CPT | Performed by: EMERGENCY MEDICINE

## 2024-03-01 PROCEDURE — 99283 EMERGENCY DEPT VISIT LOW MDM: CPT

## 2024-03-01 PROCEDURE — 81003 URINALYSIS AUTO W/O SCOPE: CPT | Performed by: EMERGENCY MEDICINE

## 2024-03-01 PROCEDURE — 87491 CHLMYD TRACH DNA AMP PROBE: CPT | Performed by: EMERGENCY MEDICINE

## 2024-03-01 PROCEDURE — 36415 COLL VENOUS BLD VENIPUNCTURE: CPT | Performed by: EMERGENCY MEDICINE

## 2024-03-01 NOTE — Clinical Note
Hal Li was seen and treated in our emergency department on 3/1/2024.                Diagnosis:     Hal  may return to work on return date.    He may return on this date: 03/04/2024         If you have any questions or concerns, please don't hesitate to call.      Jerardo Lerner MD    ______________________________           _______________          _______________  Hospital Representative                              Date                                Time

## 2024-03-02 LAB
ATRIAL RATE: 61 BPM
C TRACH DNA SPEC QL NAA+PROBE: NEGATIVE
N GONORRHOEA DNA SPEC QL NAA+PROBE: NEGATIVE
P AXIS: 30 DEGREES
PR INTERVAL: 142 MS
QRS AXIS: 80 DEGREES
QRSD INTERVAL: 100 MS
QT INTERVAL: 402 MS
QTC INTERVAL: 404 MS
T WAVE AXIS: 57 DEGREES
VENTRICULAR RATE: 61 BPM

## 2024-03-02 PROCEDURE — 93010 ELECTROCARDIOGRAM REPORT: CPT | Performed by: INTERNAL MEDICINE

## 2024-03-02 NOTE — ED PROVIDER NOTES
"History  Chief Complaint   Patient presents with   • Fatigue     Patient got blood work done for glucose and told that his sugar was low. C/o feeling fatigued and urinating more. States, \"I feel like sometimes my sugar is high rather than low\".      Hal is a 28-year-old male here for evaluation of generalized fatigue.  He reports that he has been having issues with feeling overly tired and generally unwell for months now.  Sometimes he feels like he urinates more than he should.  Denies dysuria or urgency.  Had some outpatient blood work done by his PCP in late January.  It sounds like he was told that his blood sugar was \"low\" although on review of the EMR it appears he had a normal random blood sugar and normal 2-hour glucose test.  States he also has been having some discomfort in his left chest for the past week or so.  Otherwise no new symptoms, just feeling more fatigued today.      Fatigue  Associated symptoms: no abdominal pain, no arthralgias, no chest pain, no cough, no dysuria, no fever, no seizures, no shortness of breath and no vomiting        Prior to Admission Medications   Prescriptions Last Dose Informant Patient Reported? Taking?   ondansetron (ZOFRAN) 4 mg tablet   No No   Sig: Take 1 tablet (4 mg total) by mouth every 6 (six) hours      Facility-Administered Medications: None       Past Medical History:   Diagnosis Date   • ADHD        History reviewed. No pertinent surgical history.    History reviewed. No pertinent family history.  I have reviewed and agree with the history as documented.    E-Cigarette/Vaping   • E-Cigarette Use Never User      E-Cigarette/Vaping Substances   • Nicotine Yes    • THC No    • CBD No    • Flavoring No    • Other No    • Unknown No      Social History     Tobacco Use   • Smoking status: Never   • Smokeless tobacco: Never   Vaping Use   • Vaping status: Never Used   Substance Use Topics   • Alcohol use: No   • Drug use: No       Review of Systems "   Constitutional:  Positive for fatigue. Negative for chills and fever.   HENT:  Negative for ear pain and sore throat.    Eyes:  Negative for visual disturbance.   Respiratory:  Positive for chest tightness. Negative for cough and shortness of breath.    Cardiovascular:  Negative for chest pain and palpitations.   Gastrointestinal:  Negative for abdominal pain and vomiting.   Genitourinary:  Negative for dysuria and hematuria.   Musculoskeletal:  Negative for arthralgias and back pain.   Skin:  Negative for color change and rash.   Neurological:  Negative for seizures and syncope.   All other systems reviewed and are negative.      Physical Exam  Physical Exam  Vitals and nursing note reviewed.   Constitutional:       General: He is not in acute distress.     Appearance: He is well-developed.   HENT:      Head: Normocephalic and atraumatic.   Eyes:      Conjunctiva/sclera: Conjunctivae normal.   Cardiovascular:      Rate and Rhythm: Normal rate and regular rhythm.      Heart sounds: No murmur heard.  Pulmonary:      Effort: Pulmonary effort is normal. No respiratory distress.      Breath sounds: Normal breath sounds.   Abdominal:      Palpations: Abdomen is soft.      Tenderness: There is no abdominal tenderness.   Musculoskeletal:         General: No swelling.      Cervical back: Neck supple.   Skin:     General: Skin is warm and dry.      Capillary Refill: Capillary refill takes less than 2 seconds.   Neurological:      General: No focal deficit present.      Mental Status: He is alert and oriented to person, place, and time.   Psychiatric:         Mood and Affect: Mood normal.       Vital Signs  ED Triage Vitals   Temperature Pulse Respirations Blood Pressure SpO2   03/01/24 1811 03/01/24 1805 03/01/24 1805 03/01/24 1805 03/01/24 1805   98.2 °F (36.8 °C) 73 18 132/70 100 %      Temp Source Heart Rate Source Patient Position - Orthostatic VS BP Location FiO2 (%)   03/01/24 1811 03/01/24 1805 03/01/24 1805  03/01/24 1805 --   Oral Monitor Lying Right arm       Pain Score       03/01/24 1805       7           Vitals:    03/01/24 1805   BP: 132/70   Pulse: 73   Patient Position - Orthostatic VS: Lying         Visual Acuity  Visual Acuity      Flowsheet Row Most Recent Value   L Pupil Size (mm) 2   R Pupil Size (mm) 2            ED Medications  Medications - No data to display    Diagnostic Studies  Results Reviewed       Procedure Component Value Units Date/Time    HS Troponin 0hr (reflex protocol) [[id number]]  (Normal) Collected: 03/01/24 1905    Lab Status: Final result Specimen: Blood from Arm, Right Updated: 03/01/24 1937     hs TnI 0hr <2 ng/L     HS Troponin I 2hr [[id number]]     Lab Status: No result Specimen: Blood     Basic metabolic panel [[id number]] Collected: 03/01/24 1905    Lab Status: Final result Specimen: Blood from Arm, Right Updated: 03/01/24 1930     Sodium 138 mmol/L      Potassium 3.8 mmol/L      Chloride 104 mmol/L      CO2 27 mmol/L      ANION GAP 7 mmol/L      BUN 24 mg/dL      Creatinine 0.94 mg/dL      Glucose 89 mg/dL      Calcium 9.3 mg/dL      eGFR 111 ml/min/1.73sq m     Narrative:      [employer] guidelines for Chronic Kidney Disease (CKD):   •  Stage 1 with normal or high GFR (GFR > 90 mL/min/1.73 square meters)  •  Stage 2 Mild CKD (GFR = 60-89 mL/min/1.73 square meters)  •  Stage 3A Moderate CKD (GFR = 45-59 mL/min/1.73 square meters)  •  Stage 3B Moderate CKD (GFR = 30-44 mL/min/1.73 square meters)  •  Stage 4 Severe CKD (GFR = 15-29 mL/min/1.73 square meters)  •  Stage 5 End Stage CKD (GFR <15 mL/min/1.73 square meters)  Note: GFR calculation is accurate only with a steady state creatinine    Hepatic function panel [[id number]]  (Abnormal) Collected: 03/01/24 1905    Lab Status: Final result Specimen: Blood from Arm, Right Updated: 03/01/24 1930     Total Bilirubin 0.43 mg/dL      Bilirubin, Direct 0.06 mg/dL      Alkaline Phosphatase 42 U/L      AST 12 U/L       ALT 10 U/L      Total Protein 7.0 g/dL      Albumin 4.8 g/dL     Lipase [[id number]]  (Normal) Collected: 03/01/24 1905    Lab Status: Final result Specimen: Blood from Arm, Right Updated: 03/01/24 1930     Lipase 46 u/L     CBC and differential [[id number]] Collected: 03/01/24 1905    Lab Status: Final result Specimen: Blood from Arm, Right Updated: 03/01/24 1916     WBC 7.61 Thousand/uL      RBC 4.81 Million/uL      Hemoglobin 14.0 g/dL      Hematocrit 40.9 %      MCV 85 fL      MCH 29.1 pg      MCHC 34.2 g/dL      RDW 11.9 %      MPV 9.7 fL      Platelets 194 Thousands/uL      nRBC 0 /100 WBCs      Neutrophils Relative 54 %      Immat GRANS % 0 %      Lymphocytes Relative 33 %      Monocytes Relative 6 %      Eosinophils Relative 6 %      Basophils Relative 1 %      Neutrophils Absolute 4.13 Thousands/µL      Immature Grans Absolute 0.02 Thousand/uL      Lymphocytes Absolute 2.53 Thousands/µL      Monocytes Absolute 0.43 Thousand/µL      Eosinophils Absolute 0.42 Thousand/µL      Basophils Absolute 0.08 Thousands/µL                    No orders to display              Procedures  Procedures         ED Course  ED Course as of 03/01/24 1948   Fri Mar 01, 2024   1940 EKG independently interpreted by me: Normal sinus rhythm                               SBIRT 20yo+      Flowsheet Row Most Recent Value   Initial Alcohol Screen: US AUDIT-C     1. How often do you have a drink containing alcohol? 0 Filed at: 03/01/2024 1806   2. How many drinks containing alcohol do you have on a typical day you are drinking?  0 Filed at: 03/01/2024 1806   3a. Male UNDER 65: How often do you have five or more drinks on one occasion? 0 Filed at: 03/01/2024 1806   3b. FEMALE Any Age, or MALE 65+: How often do you have 4 or more drinks on one occassion? 0 Filed at: 03/01/2024 1806   Audit-C Score 0 Filed at: 03/01/2024 1806   NING: How many times in the past year have you...    Used an illegal drug or used a prescription medication for  non-medical reasons? Never Filed at: 03/01/2024 1801                      Medical Decision Making  Hal is a 28-year-old male here for evaluation of fatigue.  This has been chronic for months.  He is concerned about his blood sugar.  It appears normal today.    Amount and/or Complexity of Data Reviewed  Labs: ordered. Decision-making details documented in ED Course.  ECG/medicine tests: ordered and independent interpretation performed. Decision-making details documented in ED Course.     Details: EKG independently interpreted by me: Normal sinus rhythm at rate of 61, normal axis, normal intervals, there is voltage criteria for LVH, no significant ST elevations or depressions to suggest cardiac ischemia.  Unchanged from previous EKG from 2018.             Disposition  Final diagnoses:   Fatigue     Time reflects when diagnosis was documented in both MDM as applicable and the Disposition within this note       Time User Action Codes Description Comment    3/1/2024  7:43 PM Jerardo Lerner Add [R53.83] Fatigue           ED Disposition       ED Disposition   Discharge    Condition   Stable    Date/Time   Fri Mar 1, 2024 1943    Comment   Hal FARMER [facility] discharge to home/self care.                   Follow-up Information       Follow up With Specialties Details Why Contact Info Additional Information    [address] [id number] [phone] [address], [address] [phone]            Patient's Medications   Discharge Prescriptions    No medications on file       No discharge procedures on file.    PDMP Review       None            ED Provider  Electronically Signed by           Thousand/uL      Lymphocytes Absolute 2.53 Thousands/µL      Monocytes Absolute 0.43 Thousand/µL      Eosinophils Absolute 0.42 Thousand/µL      Basophils Absolute 0.08 Thousands/µL                    No orders to display              Procedures  Procedures         ED Course  ED Course as of 03/06/24 1309   Fri Mar 01, 2024   1940 EKG independently interpreted by me: Normal sinus rhythm                               SBIRT 22yo+      Flowsheet Row Most Recent Value   Initial Alcohol Screen: US AUDIT-C     1. How often do you have a drink containing alcohol? 0 Filed at: 03/01/2024 1806   2. How many drinks containing alcohol do you have on a typical day you are drinking?  0 Filed at: 03/01/2024 1806   3a. Male UNDER 65: How often do you have five or more drinks on one occasion? 0 Filed at: 03/01/2024 1806   3b. FEMALE Any Age, or MALE 65+: How often do you have 4 or more drinks on one occassion? 0 Filed at: 03/01/2024 1806   Audit-C Score 0 Filed at: 03/01/2024 1806   NING: How many times in the past year have you...    Used an illegal drug or used a prescription medication for non-medical reasons? Never Filed at: 03/01/2024 1806                      Medical Decision Making  Hal is a 28-year-old male here for evaluation of fatigue.  This has been chronic for months.  He is concerned about his blood sugar.  It appears normal today.  Other laboratory studies to assess for electrolyte abnormalities, anemia, pancreatic insufficiency are within normal limits.  No evidence of UTI.  On the second evaluation the patient also requested testing for common STDs though he currently is not symptomatic.  Will send GC/chlamydia and patient can follow-up as outpatient.  We discussed plan for discharge with outpatient follow-up and return precautions.    Amount and/or Complexity of Data Reviewed  Labs: ordered. Decision-making details documented in ED Course.  ECG/medicine tests: ordered and independent interpretation performed.  Decision-making details documented in ED Course.     Details: EKG independently interpreted by me: Normal sinus rhythm at rate of 61, normal axis, normal intervals, there is voltage criteria for LVH, no significant ST elevations or depressions to suggest cardiac ischemia.  Unchanged from previous EKG from 2018.             Disposition  Final diagnoses:   Fatigue     Time reflects when diagnosis was documented in both MDM as applicable and the Disposition within this note       Time User Action Codes Description Comment    3/1/2024  7:43 PM Jerardo Lerner Add [R53.83] Fatigue           ED Disposition       ED Disposition   Discharge    Condition   Stable    Date/Time   Fri Mar 1, 2024 1943    Comment   Hal Li discharge to home/self care.                   Follow-up Information       Follow up With Specialties Details Why Contact Info Additional Information    [address] [address] [phone] [address], [id number] [phone]            Discharge Medication List as of 3/1/2024  7:47 PM        CONTINUE these medications which have NOT CHANGED    Details   ondansetron (ZOFRAN) 4 mg tablet Take 1 tablet (4 mg total) by mouth every 6 (six) hours, Starting Tue 4/4/2023, Print             No discharge procedures on file.    PDMP Review       None            ED Provider  Electronically Signed by             Jerardo Lerner MD  03/06/24 6734